# Patient Record
Sex: MALE | Race: BLACK OR AFRICAN AMERICAN
[De-identification: names, ages, dates, MRNs, and addresses within clinical notes are randomized per-mention and may not be internally consistent; named-entity substitution may affect disease eponyms.]

---

## 2019-03-06 ENCOUNTER — HOSPITAL ENCOUNTER (EMERGENCY)
Dept: HOSPITAL 61 - ER | Age: 39
Discharge: HOME | End: 2019-03-06
Payer: OTHER GOVERNMENT

## 2019-03-06 VITALS — SYSTOLIC BLOOD PRESSURE: 124 MMHG | DIASTOLIC BLOOD PRESSURE: 75 MMHG

## 2019-03-06 VITALS — BODY MASS INDEX: 32.58 KG/M2 | HEIGHT: 69 IN | WEIGHT: 220 LBS

## 2019-03-06 DIAGNOSIS — R51: Primary | ICD-10-CM

## 2019-03-06 DIAGNOSIS — E78.00: ICD-10-CM

## 2019-03-06 LAB
ALBUMIN SERPL-MCNC: 3.6 G/DL (ref 3.4–5)
ALBUMIN/GLOB SERPL: 0.9 {RATIO} (ref 1–1.7)
ALP SERPL-CCNC: 100 U/L (ref 46–116)
ALT SERPL-CCNC: 154 U/L (ref 16–63)
AMPHETAMINE/METHAMPHETAMINE: (no result)
ANION GAP SERPL CALC-SCNC: 15 MMOL/L (ref 6–14)
AST SERPL-CCNC: 79 U/L (ref 15–37)
BARBITURATES UR-MCNC: (no result) UG/ML
BASOPHILS # BLD AUTO: 0 X10^3/UL (ref 0–0.2)
BASOPHILS NFR BLD: 1 % (ref 0–3)
BENZODIAZ UR-MCNC: (no result) UG/L
BILIRUB SERPL-MCNC: 0.3 MG/DL (ref 0.2–1)
BUN SERPL-MCNC: 23 MG/DL (ref 8–26)
BUN/CREAT SERPL: 29 (ref 6–20)
CALCIUM SERPL-MCNC: 8.4 MG/DL (ref 8.5–10.1)
CANNABINOIDS UR-MCNC: (no result) UG/L
CHLORIDE SERPL-SCNC: 99 MMOL/L (ref 98–107)
CK SERPL-CCNC: 96 U/L (ref 39–308)
CO2 SERPL-SCNC: 22 MMOL/L (ref 21–32)
COCAINE UR-MCNC: (no result) NG/ML
CREAT SERPL-MCNC: 0.8 MG/DL (ref 0.7–1.3)
EOSINOPHIL NFR BLD: 0.1 X10^3/UL (ref 0–0.7)
EOSINOPHIL NFR BLD: 2 % (ref 0–3)
ERYTHROCYTE [DISTWIDTH] IN BLOOD BY AUTOMATED COUNT: 14 % (ref 11.5–14.5)
GFR SERPLBLD BASED ON 1.73 SQ M-ARVRAT: 130.9 ML/MIN
GLOBULIN SER-MCNC: 4 G/DL (ref 2.2–3.8)
GLUCOSE SERPL-MCNC: 205 MG/DL (ref 70–99)
HCT VFR BLD CALC: 40.6 % (ref 39–53)
HGB BLD-MCNC: 13.8 G/DL (ref 13–17.5)
LYMPHOCYTES # BLD: 1.1 X10^3/UL (ref 1–4.8)
LYMPHOCYTES NFR BLD AUTO: 19 % (ref 24–48)
MAGNESIUM SERPL-MCNC: 1.9 MG/DL (ref 1.8–2.4)
MCH RBC QN AUTO: 30 PG (ref 25–35)
MCHC RBC AUTO-ENTMCNC: 34 G/DL (ref 31–37)
MCV RBC AUTO: 87 FL (ref 79–100)
METHADONE SERPL-MCNC: (no result) NG/ML
MONO #: 0.4 X10^3/UL (ref 0–1.1)
MONOCYTES NFR BLD: 7 % (ref 0–9)
NEUT #: 4.2 X10^3UL (ref 1.8–7.7)
NEUTROPHILS NFR BLD AUTO: 72 % (ref 31–73)
OPIATES UR-MCNC: (no result) NG/ML
PCP SERPL-MCNC: (no result) MG/DL
PLATELET # BLD AUTO: 103 X10^3/UL (ref 140–400)
POTASSIUM SERPL-SCNC: 3.9 MMOL/L (ref 3.5–5.1)
PROT SERPL-MCNC: 7.6 G/DL (ref 6.4–8.2)
RBC # BLD AUTO: 4.67 X10^6/UL (ref 4.3–5.7)
SODIUM SERPL-SCNC: 136 MMOL/L (ref 136–145)
WBC # BLD AUTO: 5.8 X10^3/UL (ref 4–11)

## 2019-03-06 PROCEDURE — 85025 COMPLETE CBC W/AUTO DIFF WBC: CPT

## 2019-03-06 PROCEDURE — 82550 ASSAY OF CK (CPK): CPT

## 2019-03-06 PROCEDURE — 96374 THER/PROPH/DIAG INJ IV PUSH: CPT

## 2019-03-06 PROCEDURE — 36415 COLL VENOUS BLD VENIPUNCTURE: CPT

## 2019-03-06 PROCEDURE — 83605 ASSAY OF LACTIC ACID: CPT

## 2019-03-06 PROCEDURE — 70450 CT HEAD/BRAIN W/O DYE: CPT

## 2019-03-06 PROCEDURE — 80053 COMPREHEN METABOLIC PANEL: CPT

## 2019-03-06 PROCEDURE — 99284 EMERGENCY DEPT VISIT MOD MDM: CPT

## 2019-03-06 PROCEDURE — 83735 ASSAY OF MAGNESIUM: CPT

## 2019-03-06 PROCEDURE — 96375 TX/PRO/DX INJ NEW DRUG ADDON: CPT

## 2019-03-06 PROCEDURE — 80307 DRUG TEST PRSMV CHEM ANLYZR: CPT

## 2019-03-06 NOTE — RAD
CT head without contrast dated 3/6/2019.

 

No comparison available.

 

Clinical data indication: Seizure. Headache.

 

TECHNIQUE:

 

Contiguous axial imaging the head was performed from skull base to vertex.

No contrast administered.

 

FINDINGS:

 

Ventricles and sulci are within normal limits for age. No midline shift or

mass effect. Brain parenchyma is of normal attenuation. No hemorrhage or 

extra-axial collection. Posterior fossa and brainstem unremarkable.

 

Visualized paranasal sinuses and mastoid air cells are clear. No apparent 

calvarial abnormality.

 

IMPRESSION:

 

No evidence of acute intracranial abnormality.

 

Electronically signed by: Wayne Flores MD (3/6/2019 2:02 AM) 

[facility]

## 2019-03-06 NOTE — PHYS DOC
Past Medical History


Past Medical History:  High Cholesterol, Migraines


Additional Past Medical Histor:  MS


Past Surgical History:  No Surgical History


Smoking:  Less than 1pk/day


Alcohol Use:  Occasionally


Drug Use:  None





Adult General


Chief Complaint


Chief Complaint:  HEADACHE





HPI


HPI


37 y/o male with pmh of migraine headaches and MS presents with 3 week history 

of intermittent headaches.  Reports concern tonight spouse witness seizure like 

activity at [place] which lasted approximately 30 sec.  Patient therefore brought to 

ED for evaluation.  Spouse reports upon their presentation to the ED the 

seizure like activity started again.  Denies history of seizure.  Denies post-

ictal state.  Denies loss of bowel/bladder.  Reports current headache.  Denies 

fever/chills.  Denies neck pain.





Review of Systems


Review of Systems





Constitutional: Denies fever or chills []


Eyes: Denies change in visual acuity, redness, or eye pain []


HENT: Denies nasal congestion or sore throat []


Respiratory: Denies cough or shortness of breath []


Cardiovascular: Denies chest pain or palpitations


GI: Denies abdominal pain, vomiting, or diarrhea; reports nausea


: Denies dysuria or hematuria []


Musculoskeletal: Denies neck pain or joint pain []


Integument: Denies rash or skin lesions []


Neurologic: Reports headache and seizure like activity; denies focal weakness 

or sensory changes []





Complete systems were reviewed and found to be within normal limits, except as 

documented in this note.





Current Medications


Current Medications





Current Medications








 Medications


  (Trade)  Dose


 Ordered  Sig/Dorothy  Start Time


 Stop Time Status Last Admin


Dose Admin


 


 Acetaminophen/


 Butalbital/


 Caffeine


  (Fioricet)  2 tab  1X  ONCE  3/6/19 04:30


 3/6/19 04:31 DC 3/6/19 04:08


2 TAB


 


 Dexamethasone


 Sodium Phosphate


  (Decadron)  10 mg  1X  ONCE  3/6/19 02:00


 3/6/19 02:01 DC 3/6/19 02:02


10 MG


 


 Lorazepam


  (Ativan)  0.5 mg  1X  ONCE  3/6/19 02:00


 3/6/19 02:01 DC 3/6/19 02:01


0.5 MG


 


 Ondansetron HCl


  (Zofran)  4 mg  1X  ONCE  3/6/19 02:00


 3/6/19 02:01 DC 3/6/19 02:01


4 MG


 


 Sodium Chloride  1,000 ml @ 


 1,000 mls/hr  1X  ONCE  3/6/19 01:30


 3/6/19 02:29 DC 3/6/19 01:56


1,000 MLS/HR











Allergies


Allergies





Allergies








Coded Allergies Type Severity Reaction Last Updated Verified


 


  No Known Drug Allergies    12/2/15 No











Physical Exam


Physical Exam





Constitutional: Well developed, well nourished, no acute distress, non-toxic 

appearance. []


HENT: Normocephalic, atraumatic, bilateral TMs normal, oropharynx moist, no 

sinus tenderness


Eyes: PERRL, EOMI, conjunctiva normal, no discharge. [] 


Neck: Normal range of motion, no tenderness, supple, no meningeal signs 


Cardiovascular: Heart rate regular rhythm, no murmur []


Lungs & Thorax:  Bilateral breath sounds clear to auscultation []


Abdomen: Soft, no tenderness, no masses, no pulsatile masses. [] 


Skin: Warm, dry, no erythema, no rash. [] 


Back: No tenderness, no CVA tenderness. [] 


Extremities: No tenderness, ROM intact, no edema. [] 


Neurologic: Alert and oriented X 3, normal motor function, normal sensory 

function, no focal deficits noted. []


Psychologic: Affect normal, judgement normal, mood normal. []





Current Patient Data


Vital Signs





 Vital Signs








  Date Time  Temp Pulse Resp B/P (MAP) Pulse Ox O2 Delivery O2 Flow Rate FiO2


 


3/6/19 04:04  77 14  100   


 


3/6/19 01:10 98.6   122/79 (93)  Room Air  





 98.6       








Lab Values





 Laboratory Tests








Test


 3/6/19


01:50 3/6/19


02:09


 


White Blood Count


 5.8 x10^3/uL


(4.0-11.0) 





 


Red Blood Count


 4.67 x10^6/uL


(4.30-5.70) 





 


Hemoglobin


 13.8 g/dL


(13.0-17.5) 





 


Hematocrit


 40.6 %


(39.0-53.0) 





 


Mean Corpuscular Volume


 87 fL ()


 





 


Mean Corpuscular Hemoglobin 30 pg (25-35)   


 


Mean Corpuscular Hemoglobin


Concent 34 g/dL


(31-37) 





 


Red Cell Distribution Width


 14.0 %


(11.5-14.5) 





 


Platelet Count


 103 x10^3/uL


(140-400)  L 





 


Neutrophils (%) (Auto) 72 % (31-73)   


 


Lymphocytes (%) (Auto) 19 % (24-48)  L 


 


Monocytes (%) (Auto) 7 % (0-9)   


 


Eosinophils (%) (Auto) 2 % (0-3)   


 


Basophils (%) (Auto) 1 % (0-3)   


 


Neutrophils # (Auto)


 4.2 x10^3uL


(1.8-7.7) 





 


Lymphocytes # (Auto)


 1.1 x10^3/uL


(1.0-4.8) 





 


Monocytes # (Auto)


 0.4 x10^3/uL


(0.0-1.1) 





 


Eosinophils # (Auto)


 0.1 x10^3/uL


(0.0-0.7) 





 


Basophils # (Auto)


 0.0 x10^3/uL


(0.0-0.2) 





 


Sodium Level


 136 mmol/L


(136-145) 





 


Potassium Level


 3.9 mmol/L


(3.5-5.1) 





 


Chloride Level


 99 mmol/L


() 





 


Carbon Dioxide Level


 22 mmol/L


(21-32) 





 


Anion Gap 15 (6-14)  H 


 


Blood Urea Nitrogen


 23 mg/dL


(8-26) 





 


Creatinine


 0.8 mg/dL


(0.7-1.3) 





 


Estimated GFR


(Cockcroft-Gault) 130.9  


 





 


BUN/Creatinine Ratio 29 (6-20)  H 


 


Glucose Level


 205 mg/dL


(70-99)  H 





 


Lactic Acid Level


 1.5 mmol/L


(0.4-2.0) 





 


Calcium Level


 8.4 mg/dL


(8.5-10.1)  L 





 


Magnesium Level


 1.9 mg/dL


(1.8-2.4) 





 


Total Bilirubin


 0.3 mg/dL


(0.2-1.0) 





 


Aspartate Amino Transferase


(AST) 79 U/L (15-37)


H 





 


Alanine Aminotransferase (ALT)


 154 U/L


(16-63)  H 





 


Alkaline Phosphatase


 100 U/L


() 





 


Creatine Kinase


 96 U/L


() 





 


Total Protein


 7.6 g/dL


(6.4-8.2) 





 


Albumin


 3.6 g/dL


(3.4-5.0) 





 


Albumin/Globulin Ratio


 0.9 (1.0-1.7)


L 





 


Urine Opiates Screen  Neg (NEG)  


 


Urine Methadone Screen  Neg (NEG)  


 


Urine Barbiturates  Neg (NEG)  


 


Urine Phencyclidine Screen  Neg (NEG)  


 


Urine


Amphetamine/Methamphetamine 


 Neg (NEG)  





 


Urine Benzodiazepines Screen  Neg (NEG)  


 


Urine Cocaine Screen  Neg (NEG)  


 


Urine Cannabinoids Screen  Neg (NEG)  


 


Urine Ethyl Alcohol  Neg (NEG)  





 Laboratory Tests


3/6/19 01:50








 Laboratory Tests


3/6/19 01:50











EKG


EKG


[]





Radiology/Procedures


Radiology/Procedures


PROCEDURE: CT HEAD WO CONTRAST





CT head without contrast dated 3/6/2019.


 


No comparison available.


 


Clinical data indication: Seizure. Headache.


 


TECHNIQUE:


 


Contiguous axial imaging the head was performed from skull base to vertex.


No contrast administered.


 


FINDINGS:


 


Ventricles and sulci are within normal limits for age. No midline shift or


mass effect. Brain parenchyma is of normal attenuation. No hemorrhage or 


extra-axial collection. Posterior fossa and brainstem unremarkable.


 


Visualized paranasal sinuses and mastoid air cells are clear. No apparent 


calvarial abnormality.


 


IMPRESSION:


 


No evidence of acute intracranial abnormality.


 


Electronically signed by: Wayne Flores MD (3/6/2019 2:02 AM) 


[facility]





Course & Med Decision Making


Course & Med Decision Making


Pertinent Labs and Imaging studies reviewed. (See chart for details)





Patient presents with 3 week history of intermittent headache.  Hx of migraine 

headaches and MS.  NO history of trauma. Afebrile. No neck pain noted.  Patient 

also with witness generalized seizure like activity without post-ictal state.  

Patient neurologically intact. NIHSS 0.  Labs obtained and posted to chart.  WBC

, CPK and lactic acid WNL.  CT head without acute process.  Symptomatic 

treatment provided for headache.





Patient stable for discharge home with close outpatient follow-up with PCP/

neurologist.  Discussed findings and plan with patient and family, who 

acknowledge understanding and agreement.  Patient also advised he would be 

unable to drive unless seizure free for 6 months or cleared by his neurologist.





Dragon Disclaimer


Dragon Disclaimer


This electronic medical record was generated, in whole or in part, using a 

voice recognition dictation system.





Departure


Departure


Impression:  


 Primary Impression:  


 Witnessed seizure-like activity


 Additional Impression:  


 Headache


Disposition:  01 HOME, SELF-CARE


Condition:  IMPROVED


Referrals:  


UNKNOWN PCP NAME (PCP)








CHRISTY HENDERSON MD


Patient Instructions:  Headache, FAQs, Seizure, Adult, Easy-to-Read


Scripts


Butalb/Acetaminophen/Caffeine ([id number] -[id number]) 1 Each Tablet


1 EACH PO Q6HRS PRN for HEADACHE, #14 TAB


   Prov: WAYNE GARCIA DO         3/6/19





NIHSS Stroke Scale


NIH Stroke Scale:  








NIH Stroke Scale Response (Comments) Value


 


Level of Consciousness:  0 Alert/Responsive 0


 


LOC Questions:  0 Answers both correctly 0


 


LOC Commands:  0 Performs both tasks 0


 


Best Gaze:  0 Normal 0


 


Visual:  0 No visual loss 0


 


Facial Palsy:  0 Normal, symmetrical 0


 


Motor - Left Arm  0 No drift 0


 


Motor - Right Arm  0 No drift 0


 


Motor - Left Leg  0 No drift 0


 


Motor: Right Leg  0 No drift 0


 


Limb Ataxia:  0 Absent 0


 


Sensory:  0 No loss 0


 


Best Language:  0 Normal 0


 


Dysathria:  0 Normal 0


 


Extinction and Inattention:  0 Normal 0


 


Total  0











Problem Qualifiers








 Additional Impression:  


 Headache


 Headache type:  unspecified  Headache chronicity pattern:  acute headache  

Intractability:  not intractable  Qualified Codes:  R51 - Headache








WAYNE GARCIA DO Mar 6, 2019 01:20